# Patient Record
Sex: FEMALE | Race: WHITE | NOT HISPANIC OR LATINO | Employment: OTHER | URBAN - METROPOLITAN AREA
[De-identification: names, ages, dates, MRNs, and addresses within clinical notes are randomized per-mention and may not be internally consistent; named-entity substitution may affect disease eponyms.]

---

## 2021-04-16 ENCOUNTER — COMMUNICATION - HEALTHEAST (OUTPATIENT)
Dept: SCHEDULING | Facility: CLINIC | Age: 86
End: 2021-04-16

## 2022-09-07 ENCOUNTER — LAB REQUISITION (OUTPATIENT)
Dept: LAB | Facility: CLINIC | Age: 87
End: 2022-09-07
Payer: COMMERCIAL

## 2022-09-08 LAB
BASOPHILS # BLD AUTO: 0 10E3/UL (ref 0–0.2)
BASOPHILS NFR BLD AUTO: 1 %
EOSINOPHIL # BLD AUTO: 0.1 10E3/UL (ref 0–0.7)
EOSINOPHIL NFR BLD AUTO: 1 %
ERYTHROCYTE [DISTWIDTH] IN BLOOD BY AUTOMATED COUNT: 15.4 % (ref 10–15)
HBA1C MFR BLD: 5.8 %
HCT VFR BLD AUTO: 39.9 % (ref 35–47)
HGB BLD-MCNC: 12.2 G/DL (ref 11.7–15.7)
IMM GRANULOCYTES # BLD: 0 10E3/UL
IMM GRANULOCYTES NFR BLD: 0 %
LYMPHOCYTES # BLD AUTO: 1.6 10E3/UL (ref 0.8–5.3)
LYMPHOCYTES NFR BLD AUTO: 34 %
MCH RBC QN AUTO: 30.3 PG (ref 26.5–33)
MCHC RBC AUTO-ENTMCNC: 30.6 G/DL (ref 31.5–36.5)
MCV RBC AUTO: 99 FL (ref 78–100)
MONOCYTES # BLD AUTO: 0.4 10E3/UL (ref 0–1.3)
MONOCYTES NFR BLD AUTO: 9 %
NEUTROPHILS # BLD AUTO: 2.6 10E3/UL (ref 1.6–8.3)
NEUTROPHILS NFR BLD AUTO: 55 %
NRBC # BLD AUTO: 0 10E3/UL
NRBC BLD AUTO-RTO: 0 /100
PLATELET # BLD AUTO: 195 10E3/UL (ref 150–450)
RBC # BLD AUTO: 4.03 10E6/UL (ref 3.8–5.2)
VIT B12 SERPL-MCNC: 358 PG/ML (ref 232–1245)
WBC # BLD AUTO: 4.8 10E3/UL (ref 4–11)

## 2022-09-08 PROCEDURE — 83036 HEMOGLOBIN GLYCOSYLATED A1C: CPT | Mod: ORL | Performed by: FAMILY MEDICINE

## 2022-09-08 PROCEDURE — P9604 ONE-WAY ALLOW PRORATED TRIP: HCPCS | Mod: ORL | Performed by: FAMILY MEDICINE

## 2022-09-08 PROCEDURE — 85025 COMPLETE CBC W/AUTO DIFF WBC: CPT | Mod: ORL | Performed by: FAMILY MEDICINE

## 2022-09-08 PROCEDURE — 36415 COLL VENOUS BLD VENIPUNCTURE: CPT | Mod: ORL | Performed by: FAMILY MEDICINE

## 2022-09-08 PROCEDURE — 80053 COMPREHEN METABOLIC PANEL: CPT | Mod: ORL | Performed by: FAMILY MEDICINE

## 2022-09-08 PROCEDURE — 84443 ASSAY THYROID STIM HORMONE: CPT | Mod: ORL | Performed by: FAMILY MEDICINE

## 2022-09-08 PROCEDURE — 82607 VITAMIN B-12: CPT | Mod: ORL | Performed by: FAMILY MEDICINE

## 2022-09-08 PROCEDURE — 82306 VITAMIN D 25 HYDROXY: CPT | Mod: ORL | Performed by: FAMILY MEDICINE

## 2022-09-09 LAB — DEPRECATED CALCIDIOL+CALCIFEROL SERPL-MC: 61 UG/L (ref 20–75)

## 2022-09-10 LAB — TSH SERPL DL<=0.005 MIU/L-ACNC: 1.25 UIU/ML (ref 0.3–4.2)

## 2022-09-11 LAB
ALBUMIN SERPL BCG-MCNC: 3.8 G/DL (ref 3.5–5.2)
ALP SERPL-CCNC: 189 U/L (ref 35–104)
ALT SERPL W P-5'-P-CCNC: 19 U/L (ref 10–35)
ANION GAP SERPL CALCULATED.3IONS-SCNC: 23 MMOL/L (ref 7–15)
AST SERPL W P-5'-P-CCNC: 44 U/L (ref 10–35)
BILIRUB SERPL-MCNC: 0.4 MG/DL
BUN SERPL-MCNC: 23.4 MG/DL (ref 8–23)
CALCIUM SERPL-MCNC: 10.4 MG/DL (ref 8.8–10.2)
CHLORIDE SERPL-SCNC: 103 MMOL/L (ref 98–107)
CREAT SERPL-MCNC: 0.94 MG/DL (ref 0.51–0.95)
DEPRECATED HCO3 PLAS-SCNC: 18 MMOL/L (ref 22–29)
GFR SERPL CREATININE-BSD FRML MDRD: 59 ML/MIN/1.73M2
GLUCOSE SERPL-MCNC: 104 MG/DL (ref 70–99)
POTASSIUM SERPL-SCNC: 5.2 MMOL/L (ref 3.4–5.3)
PROT SERPL-MCNC: 6.6 G/DL (ref 6.4–8.3)
SODIUM SERPL-SCNC: 144 MMOL/L (ref 136–145)

## 2024-07-14 ENCOUNTER — HOSPITAL ENCOUNTER (EMERGENCY)
Facility: HOSPITAL | Age: 89
Discharge: HOME | End: 2024-07-14
Attending: EMERGENCY MEDICINE | Admitting: EMERGENCY MEDICINE
Payer: COMMERCIAL

## 2024-07-14 ENCOUNTER — APPOINTMENT (OUTPATIENT)
Dept: CT IMAGING | Facility: HOSPITAL | Age: 89
End: 2024-07-14
Attending: EMERGENCY MEDICINE
Payer: COMMERCIAL

## 2024-07-14 VITALS
SYSTOLIC BLOOD PRESSURE: 147 MMHG | OXYGEN SATURATION: 95 % | DIASTOLIC BLOOD PRESSURE: 65 MMHG | RESPIRATION RATE: 12 BRPM | BODY MASS INDEX: 30.12 KG/M2 | TEMPERATURE: 98.3 F | HEIGHT: 63 IN | HEART RATE: 59 BPM | WEIGHT: 170 LBS

## 2024-07-14 DIAGNOSIS — T14.8XXA SKIN ABRASION: ICD-10-CM
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
## 2024-07-14 DIAGNOSIS — S09.90XA HEAD INJURY, INITIAL ENCOUNTER: ICD-10-CM

## 2024-07-14 PROCEDURE — 250N000009 HC RX 250: Performed by: EMERGENCY MEDICINE

## 2024-07-14 PROCEDURE — 70450 CT HEAD/BRAIN W/O DYE: CPT

## 2024-07-14 PROCEDURE — 90715 TDAP VACCINE 7 YRS/> IM: CPT | Performed by: EMERGENCY MEDICINE

## 2024-07-14 PROCEDURE — 90471 IMMUNIZATION ADMIN: CPT | Performed by: EMERGENCY MEDICINE

## 2024-07-14 PROCEDURE — 250N000011 HC RX IP 250 OP 636: Performed by: EMERGENCY MEDICINE

## 2024-07-14 PROCEDURE — 99284 EMERGENCY DEPT VISIT MOD MDM: CPT | Mod: 25

## 2024-07-14 PROCEDURE — 72125 CT NECK SPINE W/O DYE: CPT

## 2024-07-14 RX ORDER — GINSENG 100 MG
CAPSULE ORAL ONCE
Status: COMPLETED | OUTPATIENT
Start: 2024-07-14 | End: 2024-07-14

## 2024-07-14 RX ADMIN — CLOSTRIDIUM TETANI TOXOID ANTIGEN (FORMALDEHYDE INACTIVATED), CORYNEBACTERIUM DIPHTHERIAE TOXOID ANTIGEN (FORMALDEHYDE INACTIVATED), BORDETELLA PERTUSSIS TOXOID ANTIGEN (GLUTARALDEHYDE INACTIVATED), BORDETELLA PERTUSSIS FILAMENTOUS HEMAGGLUTININ ANTIGEN (FORMALDEHYDE INACTIVATED), BORDETELLA PERTUSSIS PERTACTIN ANTIGEN, AND BORDETELLA PERTUSSIS FIMBRIAE 2/3 ANTIGEN 0.5 ML: 5; 2; 2.5; 5; 3; 5 INJECTION, SUSPENSION INTRAMUSCULAR at 15:09

## 2024-07-14 RX ADMIN — BACITRACIN: 500 OINTMENT TOPICAL at 15:13

## 2024-07-14 ASSESSMENT — COLUMBIA-SUICIDE SEVERITY RATING SCALE - C-SSRS
6. HAVE YOU EVER DONE ANYTHING, STARTED TO DO ANYTHING, OR PREPARED TO DO ANYTHING TO END YOUR LIFE?: NO
1. IN THE PAST MONTH, HAVE YOU WISHED YOU WERE DEAD OR WISHED YOU COULD GO TO SLEEP AND NOT WAKE UP?: NO
2. HAVE YOU ACTUALLY HAD ANY THOUGHTS OF KILLING YOURSELF IN THE PAST MONTH?: NO

## 2024-07-14 ASSESSMENT — ACTIVITIES OF DAILY LIVING (ADL)
ADLS_ACUITY_SCORE: 35

## 2024-07-14 NOTE — ED NOTES
Bed: [place] [address]  Expected date:   Expected time:   Means of arrival:   Comments:  [facility] - 89F Fall +hit head -thinners

## 2024-07-14 NOTE — ED TRIAGE NOTES
"Patient brought by medic c/o fall \" patient in the parking lot , trying to get the walker out of the trunk , as a I was pulling it out , knock me down hit head .  Patient unable to get up right away and someone help her out.  No blood thinner, no neck pain . HX: chronic back pain     Triage Assessment (Adult)       Row Name 07/14/24 9138          Triage Assessment    Airway WDL WDL        Respiratory WDL    Respiratory WDL WDL        Skin Circulation/Temperature WDL    Skin Circulation/Temperature WDL --  abrasion forehead        Cardiac WDL    Cardiac WDL WDL        Peripheral/Neurovascular WDL    Peripheral Neurovascular WDL WDL        Cognitive/Neuro/Behavioral WDL    Cognitive/Neuro/Behavioral WDL WDL                     "

## 2024-07-14 NOTE — ED PROVIDER NOTES
Emergency Department Encounter     Evaluation Date & Time:   2024  1:36 PM    CHIEF COMPLAINT:  Fall      Triage Note:       Impression and Plan       FINAL IMPRESSION:    ICD-10-CM    1. Head injury, initial encounter  S09.90XA       2. Skin abrasion  T14.8XXA       3. Fall, initial encounter  W19.XXXA             ED COURSE & MEDICAL DECISION MAKIN:04 PM I met the patient and performed my initial interview and exam.  3:06 PM We discussed the plan for discharge and the patient is agreeable. All questions were addressed.    89 year old female, history of carcinoid tumor of appendix, small bowel obstruction, chronic low back pain, pre-diabetes, hypothyroidism, anxiety, who presents for evaluation after a mechanical fall that occurred ~1 hour ago while trying to get her walker out of her car.     She hit her forehead and the back of her head without LOC, headache, nausea or vomiting. She is not anticoagulated. There is an abrasion to the right side of her forehead and a small abrasion to the posterior scalp without associated hematoma. Neuro exam is normal, however given age, imaging pursued.  Head CT negative for acute abnormality.    Precautionary cervical spine CT also performed and negative for fracture or traumatic subluxation.    Nothing in history or on exam to suggest significant chest, abdominal, pelvis or back injury and I do not think emergent imaging studies to evaluate for such are indicated.    Her abrasion was cleaned and bacitracin applied.  Her tetanus was updated.    Patient discharged to home with follow-up with her PCP this upcoming week for a recheck.  Wound care and return instructions provided.  Patient stable throughout ED course.      At the conclusion of the encounter I discussed the results of all the tests and the disposition. The questions were answered. The patient and family acknowledged understanding and were agreeable with the care plan.      Medical Decision  Making    History:  Obtained supplemental history:Supplemental history obtained?: No  Reviewed external records: External records reviewed?: Documented in chart    External consultation:  Did you consider but not order tests?: Work up considered but not performed and documented in chart, if applicable  Did you interpret images independently?: Independent interpretation of ECG and images noted in documentation, when applicable.  Consultation discussion with other provider:Did you involve another provider (consultant, , pharmacy, etc.)?: No    Complicating factors:  Care impacted by chronic illness:Cancer/Chemotherapy and Other: chronic back pain, hypothyroidism, prediabetes  Care significantly affected by social determinants of health:N/A    Disposition considerations: Discharge. No recommendations on prescription strength medication(s). I considered admission, but ultimately discharged patient given reassuring evaluation.    MEDICATIONS GIVEN IN THE EMERGENCY DEPARTMENT:  Medications   Tdap (tetanus-diphtheria-acell pertussis) (ADACEL) injection 0.5 mL (0.5 mLs Intramuscular $Given 7/14/24 1502)   bacitracin ointment ( Topical $Given 7/14/24 5534)       NEW PRESCRIPTIONS STARTED AT TODAY'S ED VISIT:  Discharge Medication List as of 7/14/2024  4:09 PM          HPI     HPI     Ebonie Lloyd is an 89 year old female, history of carcinoid tumor of appendix, small bowel obstruction, chronic low back pain, pre-diabetes, hypothyroidism, anxiety, who presents to this ED via EMS for evaluation after a fall.    About 1 hour ago, the patient was trying to get her walker out of the trunk of her car. She fell in the process, hitting her forehead and the back of her head. She had no loss of consciousness and was able to get up with assistance and ambulate to her apartment. She denies headache, but endorses pain on her forehead associated with the abrasion. No nausea or vomiting. She is not anticoagulated.     She reports  "chronic neck pain that is at baseline. She denies extremity weakness and paresthesias.     She otherwise reports chronic back pain that is at baseline. She denies chest pain, shortness of breath, abdominal pain.     Per [facility]E, last tetanus 04-Oct-2010      Medical History     History reviewed. No pertinent past medical history.    History reviewed. No pertinent surgical history.    History reviewed. No pertinent family history.         FLUoxetine (PROZAC) 20 MG capsule  gabapentin (NEURONTIN) 100 MG capsule  levothyroxine (SYNTHROID, LEVOTHROID) 100 MCG tablet  polyethylene glycol (MIRALAX) 17 gram packet        Physical Exam     First Vitals:  Patient Vitals for the past 24 hrs:   BP Temp Temp src Pulse Resp SpO2 Height Weight   07/14/24 1504 -- -- -- 59 -- 95 % -- --   07/14/24 1500 (!) 147/65 -- -- 57 -- 95 % -- --   07/14/24 1415 (!) 147/73 -- -- 64 -- 97 % -- --   07/14/24 1401 (!) 140/68 -- -- 66 -- 94 % -- --   07/14/24 1337 -- 98.3  F (36.8  C) Oral -- -- 94 % -- --   07/14/24 1336 136/65 -- Oral 63 12 -- 1.6 m (5' 3\") 77.1 kg (170 lb)       PHYSICAL EXAM:   Physical Exam    GENERAL: Awake, alert.  In no acute distress.   HEENT: Normocephalic. Small abrasion to posterior scalp without appreciable associated scalp hematoma. Abrasion to right side of forehead; face is otherwise atraumatic. Pupils equal, round and reactive. Conjunctiva normal. EOMI without nystagmus or entrapment.   NECK: Cervical collar in place. After imaging, she has no midline tenderness to palpation of cervical spine. No acute pain with full ROM neck. No stridor.  PULMONARY: Chest is atraumatic and non-tender to palpation. No palpable subcutaneous emphysema. Symmetrical breath sounds without distress.  Lungs clear to auscultation bilaterally without wheezes, rhonchi or rales.  CARDIO: Regular rate and rhythm.  No significant murmur, rub or gallop.    ABDOMINAL: Abdomen atraumatic, soft, non-distended and non-tender to palpation.  No CVAT, " BL.  BACK: Back is atraumatic. No midline tenderness to palpation of thoracic and lumbar spines.     EXTREMITIES: Small, superficial abrasion to right knee.       NEURO: GCS 15. Alert and oriented to person, place and time.  Cranial nerves grossly intact.  Strength 5/5 BL upper and lower extremities with sensation to light touch grossly intact.   PSYCH: Normal mood and affect.  SKIN: Abrasion to right forehead, as noted above.     Results     LAB:  All pertinent labs reviewed and interpreted  Labs Ordered and Resulted from Time of ED Arrival to Time of ED Departure - No data to display    RADIOLOGY:  CT Cervical Spine w/o Contrast   Final Result   IMPRESSION:   HEAD CT:   1.  No evidence of acute traumatic intracranial abnormality.   2.  Brain atrophy and presumed chronic microvascular ischemic changes as above.   3.  Scattered paranasal sinus mucosal thickening with air-fluid level in the right maxillary sinus. Correlate with any evidence of acute sinusitis.      CERVICAL SPINE CT:   1.  No CT evidence for acute fracture or traumatic malalignment involving the cervical spine.   2.  Multilevel cervical spondylosis, further detailed above.      Head CT w/o contrast   Final Result   IMPRESSION:   HEAD CT:   1.  No evidence of acute traumatic intracranial abnormality.   2.  Brain atrophy and presumed chronic microvascular ischemic changes as above.   3.  Scattered paranasal sinus mucosal thickening with air-fluid level in the right maxillary sinus. Correlate with any evidence of acute sinusitis.      CERVICAL SPINE CT:   1.  No CT evidence for acute fracture or traumatic malalignment involving the cervical spine.   2.  Multilevel cervical spondylosis, further detailed above.            I, Gildardo Harper, am serving as a scribe to document services personally performed by Louisa Mendoza MD based on my observation and the provider's statements to me. I, Louisa Mendoza MD attest that Gildardo Harper is acting in a scribe  capacity, has observed my performance of the services and has documented them in accordance with my direction.    Louisa Mendoza MD  Emergency Medicine  [facility] EMERGENCY DEPARTMENT           Louisa Mendoza MD  07/15/24 7536

## 2024-07-14 NOTE — DISCHARGE INSTRUCTIONS
Please follow-up with your Primary Care Provider within 3-5 days for a recheck; call to arrange appointment.      Return to the ER for headache, focal neurologic deficit (for example, facial droop or right arm weakness), persistent nausea / vomiting, signs of wound infection (pain, swelling, redness, drainage of pus), fever or other concerns.     You can apply over-the-counter bacitracin to the abrasion twice daily for the next few days.